# Patient Record
Sex: MALE | Race: WHITE | NOT HISPANIC OR LATINO | Employment: STUDENT | URBAN - METROPOLITAN AREA
[De-identification: names, ages, dates, MRNs, and addresses within clinical notes are randomized per-mention and may not be internally consistent; named-entity substitution may affect disease eponyms.]

---
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
## 2020-04-20 ENCOUNTER — COMMUNICATION - HEALTHEAST (OUTPATIENT)
Dept: FAMILY MEDICINE | Facility: CLINIC | Age: 20
End: 2020-04-20

## 2021-05-13 ENCOUNTER — OFFICE VISIT - HEALTHEAST (OUTPATIENT)
Dept: FAMILY MEDICINE | Facility: CLINIC | Age: 21
End: 2021-05-13

## 2021-05-13 DIAGNOSIS — Z00.129 ENCOUNTER FOR ROUTINE CHILD HEALTH EXAMINATION WITHOUT ABNORMAL FINDINGS: ICD-10-CM

## 2021-05-13 ASSESSMENT — MIFFLIN-ST. JEOR: SCORE: 1526.1

## 2021-05-27 VITALS
RESPIRATION RATE: 16 BRPM | SYSTOLIC BLOOD PRESSURE: 103 MMHG | WEIGHT: 133.4 LBS | BODY MASS INDEX: 22.77 KG/M2 | HEART RATE: 47 BPM | HEIGHT: 64 IN | DIASTOLIC BLOOD PRESSURE: 53 MMHG

## 2021-05-27 NOTE — TELEPHONE ENCOUNTER
Name of form/paperwork: Other:  [place] Form  Have you been seen for this request: Last Physical was 11/30/18 with Dr Dial.  Do we have the form: Yes- Mom dropped off form at clinic, I put in out guide and placed in PURPLE team basket.  When is form needed by: N/A  How would you like the form returned: mail in postage paid envelope marianela mom provided for her.  Fax Number: n/a  Patient Notified form requests are processed in 3-5 business days: No  (If patient needs form sooner, please note that in this message.)  Okay to leave a detailed message? Yes

## 2021-05-27 NOTE — TELEPHONE ENCOUNTER
Name of form/paperwork: Other:  [place] form   Have you been seen for this request: Yes:  Last seen on 11/30/18  Do we have the form: Yes- Received 4/12/19  When is form needed by: asap  How would you like the form returned: Pt's mom would like form returned via mail. Envelope with address is attached to form.   Fax Number: n/a  Patient Notified form requests are processed in 3-5 business days: Yes. Placed form in purple team's work basket.  Okay to leave a detailed message? Yes.

## 2021-05-28 ASSESSMENT — ASTHMA QUESTIONNAIRES: ACT_TOTALSCORE: 23

## 2021-05-29 NOTE — TELEPHONE ENCOUNTER
Name of form/paperwork: Other:  student health evaluation form  Have you been seen for this request: Yes:  in chart  Do we have the form: Yes- 5-  When is form needed by: 5 business days  How would you like the form returned: mail with provided envelope  Fax Number: n/a  Patient Notified form requests are processed in 3-5 business days: Yes  (If patient needs form sooner, please note that in this message.)  Okay to leave a detailed message? Yes    Form placed in purple teams bin    Please mail

## 2021-05-30 VITALS — WEIGHT: 153 LBS | HEIGHT: 64 IN | BODY MASS INDEX: 26.12 KG/M2

## 2021-06-02 VITALS — BODY MASS INDEX: 24.59 KG/M2 | HEIGHT: 64 IN | WEIGHT: 144 LBS

## 2021-06-07 NOTE — TELEPHONE ENCOUNTER
Form in your basket. Has not been seen since 11/2018. Please review and advise if you want WCC done before doing form

## 2021-06-07 NOTE — TELEPHONE ENCOUNTER
Forms Request  Name of form/paperwork: Other:  [place] physical form  Have you been seen for this request: Last seen on 11.30.18  Do we have the form: Yes- Form was mailed into the clinic, I put in out guide and placed in PURPLE team basket.  When is form needed by: N/A  How would you like the form returned: Mail Envelope included with the form to mail back to patient.  Patient Notified form requests are processed in 3-5 business days: No    Okay to leave a detailed message? Yes

## 2021-06-08 NOTE — PROGRESS NOTES
[facility] Well Child Check    ASSESSMENT & PLAN  Parviz Meneses is a 16  y.o. 1  m.o. who has normal growth and normal development.    Diagnoses and all orders for this visit:    Encounter for routine child health examination without abnormal findings  -     Hearing Screening  -     Vision Screening    Other orders  -     clindamycin-benzoyl peroxide (BENZACLIN) gel; Apply to affected area 2 times daily - face, neck, back  Dispense: 50 g; Refill: 3  -     HPV vaccine 9 valent 3 dose IM  -     Meningococcal MCV4P    Pt is working on weight loss  Feels acne medication is working well    Return to clinic in 1 year for a Well Child Check or sooner as needed    IMMUNIZATIONS/LABS  Immunizations were reviewed and orders were placed as appropriate.    REFERRALS  Dental:  Recommend routine dental care as appropriate.  Other:  No additional referrals were made at this time.    ANTICIPATORY GUIDANCE  I have reviewed age appropriate anticipatory guidance.  Social:  Friends  Parenting:  Homework  Nutrition:  Body Image  Health:  Acne, Drugs, Alcohol and Self Testicular Exam    HEALTH HISTORY  Do you have any concerns that you'd like to discuss today?: No concerns       Refills needed? Yes    Do you have any forms that need to be filled out? No        Do you have any significant health concerns in your family history?: No  No family history on file.  Since your last visit, have there been any major changes in your family, such as a move, job change, separation, divorce, or death in the family?: No    Home  Who lives in your home?:   Mom, step-dad, 6 sisters   Social History     Social History Narrative     Do you have any trouble with sleep?:  No    Education  What school does your child attend?:   [employer]   What grade is your child in?:  10th  How does the patient perform in school (grades, behavior, attention, homework?:   Not doing as well as he thinks he could do    He could be doing better. He occasionally gets  behind on homework, but he does well on finals. He is trying to stay on top of things this semester. He has been averaging B's or slightly below.    Eating  Does patient eat regular meals including fruits and vegatables?:  yes  What is the patient drinking (cow's milk, water, soda, juice, sports drinks, energy drinks, etc)?: cow's milk- 1%, some pop, water   Does patient have concerns about body or appearance?:  No    Activities  Does the patient have friends?:  yes  Does the patient get at least one hour of physical activity per day?:  yes, trying   Does the patient have less than 2 hours of screen time per day (aside from homework)?:  no, 3-4 hours daily  What does your child do for exercise?:   Sit-ups and push-ups  Does the patient have interest/participate in community activities/volunteers/school sports?:  Yes, music class, theater, Rastafarian     MENTAL HEALTH SCREENING  PHQ-2 Total Score: 0 (2/3/2017  3:00 PM)  PHQ-2 Total Score: 0 (2/3/2017  3:00 PM)    VISION/HEARING  Vision: Completed. See Results  Hearing:  Completed. See Results     Hearing Screening    125Hz 250Hz 500Hz 1000Hz 2000Hz 3000Hz 4000Hz 6000Hz 8000Hz   Right ear:   25 25 25  25     Left ear:   25 25 25  25        Visual Acuity Screening    Right eye Left eye Both eyes   Without correction: 10/10 10/10 10/8   With correction:          TB Risk Assessment:  The patient and/or parent/guardian answer positive to:  patient and/or parent/guardian answer 'no' to all screening TB questions    Flouride Varnish Application Screening  Is child seen by dentist?     Yes    Patient Active Problem List   Diagnosis     Allergy To Dogs     Attention-deficit Hyperactivity Disorder     Contusion Of The Right Elbow With Intact Skin Surface     A Fall Due To Slipping, Tripping, Or Stumbling     Closed Colles' Fracture Of The Left Wrist       Drugs  Does the patient use tobacco/alcohol/drugs?:  no    Safety  Does the patient have any safety concerns (peer or home)?:   "no  Does the patient use safety belts, helmets and other safety equipment?:  yes    Sex  Is the patient sexually active?:  no    MEASUREMENTS  Height:  5' 3.5\" (1.613 m)  Weight: 153 lb (69.4 kg)  BMI: Body mass index is 26.68 kg/(m^2).  Blood Pressure: 102/54  Blood pressure percentiles are 17 % systolic and 20 % diastolic based on NHBPEP's 4th Report. Blood pressure percentile targets: 90: 126/79, 95: 130/83, 99 + 5 mmH/96.    REVIEW OF SYSTEMS  His mother is in the lobby. He hit his elbow on the ice the other day. It is slightly sore on occasion, but he is not overly concerned about it. He has good range of motion. The acne medication is working well for him, and it does not dry his skin out. He has been using it once per night, mostly just on his face. He denies pain with bowel movements or blood in the stool. He has been doing monthly self testicular exams. He denies joint pain or growing pains. He is slightly heavier than he would like to be, so he has been working out. He is down a few pounds from the last time he was seen. He does 30 pushups and sit ups before bed. He also does some planks. He has been keeping track of his diet on his phone. He rarely uses his inhaler. He only uses it when he is in contact with large dogs. He is not in a relationship. His friends have not been using alcohol or drugs. His mother wears corrective lenses.     PHYSICAL EXAM  General: Appears well developed and well-nourised  Head: Normocephalic   Eyes: Conjunctivae and lids are normal. Pupils are equal, round, and reactive to light.   Ears: External ears normal bilaterally  Nose: Normal  Mouth: 1+ tonsils, no swelling or exudate.   Neck: No swelling  Lungs: Clear to auscultation bilaterally  Cardiovascular: Regular rate and rhythm, no murmur present  Abdominal: Soft, normal bowel sounds, no masses or hepatosplenomegaly  Musculoskeletal: Normal range of motion. Normal spinal curvature. No joint swelling or deformity.  Skin: " No rashes or lesions  Neurological: Symmetric reflexes, no cranial nerve deficit, speech is normal.  Psychiatric: Normal mood and affect. Behavior normal.       ADDITIONAL HISTORY SUMMARIZED (2): None.  DECISION TO OBTAIN EXTRA INFORMATION (1): None.   RADIOLOGY TESTS (1): None.  LABS (1): None.  MEDICINE TESTS (1): None.  INDEPENDENT REVIEW (2 each): None.     The visit lasted a total of 11 minutes face to face with the patient. Over 50% of the time was spent counseling and educating the patient about general health maintenance.    IShabbir, am scribing for and in the presence of, Dr. Dial.    I, Dr. Dial, personally performed the services described in this documentation, as scribed by Shabbir Salgado in my presence, and it is both accurate and complete.    Total data points: 0

## 2021-06-17 NOTE — PROGRESS NOTES
[facility] Well Child Check    ASSESSMENT & PLAN  Parviz Meneses is a 20 y.o. who has normal growth and normal development.    Diagnoses and all orders for this visit:    Encounter for routine child health examination without abnormal findings  -     Cancel: Tdap vaccine greater than or equal to 6yo IM  -     Hearing Screening  -     Vision Screening      Parviz presents for an annual exam.  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  Forms are filled out.  He has allergies to dog dander.  Is a history of ADHD but does not take medication.  We were can update his tetanus, but he has his Covid vaccine this week.  He will follow-up in several weeks to update his tetanus.  Return to clinic in 1 year for a Well Child Check or sooner as needed    IMMUNIZATIONS/LABS  Immunizations were reviewed and orders were placed as appropriate.    REFERRALS  Dental:  The patient has already established care with a dentist.  Other:  No additional referrals were made at this time.    ANTICIPATORY GUIDANCE  I have reviewed age appropriate anticipatory guidance.    HEALTH HISTORY  Do you have any concerns that you'd like to discuss today?: Form for [place]      No question data found.    Do you have any significant health concerns in your family history?: ETOH/Depression - Father; Asthma mother  Family History   Problem Relation Age of Onset     Asthma Mother      Alcohol abuse Father      Depression Father      Since your last visit, have there been any major changes in your family, such as a move, job change, separation, divorce, or death in the family?: No  Has a lack of transportation kept you from medical appointments?: No    Home  Who lives in your home?:  College - parents and siblings  Social History     Social History Narrative     Not on file     Do you have any concerns about losing your housing?: No  Is your housing safe and comfortable?: Yes  Do you have any trouble with sleep?:   Yes    Education  What school do you child attend?:  [employer]  What grade are you in?:    How do you perform in school (grades, behavior, attention, homework?: Good     Eating  Do you eat regular meals including fruits and vegetables?:  yes  What are you drinking (cow's milk, water, soda, juice, sports drinks, energy drinks, etc)?: water  Have you been worried that you don't have enough food?: No  Do you have concerns about your body or appearance?:  No    Activities  Do you have friends?:  yes  Do you get at least one hour of physical activity per day?:  yes  How many hours a day are you in front of a screen other than for schoolwork (computer, TV, phone)?:  2  What do you do for exercise?:  yes   Do you have interest/participate in community activities/volunteers/school sports?:  yes    VISION/HEARING  Vision: Completed. See Results  Hearing:  Completed. See Results     Hearing Screening    125Hz 250Hz 500Hz 1000Hz 2000Hz 3000Hz 4000Hz 6000Hz 8000Hz   Right ear:   Pass Pass Pass  Pass Pass    Left ear:   Pass Pass Pass  Pass Pass       Visual Acuity Screening    Right eye Left eye Both eyes   Without correction: 20/20 20/16 20/16   With correction:          MENTAL HEALTH SCREENING  No flowsheet data found.  Social-emotional & mental health screening: No screening tool used  No concerns    TB Risk Assessment:  The patient and/or parent/guardian answer positive to:  no known risk of TB    Dyslipidemia Risk Screening  Have either of your parents or any of your grandparents had a stroke or heart attack before age 55?: No  Any parents with high cholesterol or currently taking medications to treat?: No     Dental  When was the last time you saw the dentist?: 1-3 months ago   Parent/Guardian declines the fluoride varnish application today. Fluoride not applied today.    Patient Active Problem List   Diagnosis     Allergy To Dogs     Attention deficit hyperactivity disorder (ADHD)     Contusion Of The Right Elbow With  "Intact Skin Surface     A Fall Due To Slipping, Tripping, Or Stumbling     Closed Colles' Fracture Of The Left Wrist       Drugs  Does the patient use tobacco/alcohol/drugs?:  no    Safety  Does the patient have any safety concerns (peer or home)?:  no  Does the patient use safety belts, helmets and other safety equipment?:  yes    Sex  Have you ever had sex?:  No    MEASUREMENTS  Height:  5' 4\" (1.626 m)  Weight: 133 lb 6.4 oz (60.5 kg)  BMI: Body mass index is 22.9 kg/m .  Blood Pressure: 103/53  Growth percentile SmartLinks can only be used for patients less than 20 years old.    PHYSICAL EXAM  Physical Exam     General: Awake, Alert and Cooperative   Head: Normocephalic and Atraumatic   Eyes: PERRL and EOMI   ENT: Normal pearly TMs bilaterally and Oropharynx clear   Neck: Supple and Thyroid without enlargement or nodules   Chest: Chest wall normal   Lungs: Clear to auscultation bilaterally   Heart:: Regular rate and rhythm and no murmurs   Abdomen: Soft, nontender, nondistended and no hepatosplenomegaly   : declined   Spine: Spine straight without curvature noted and Curvature of the spine noted on forward bending   Musculoskeletal: Moving all extremities and No pain in the extremities   Neuro: Alert and oriented times 3, Normal tone in upper and lower extremities, Cranial nerves 2-12 intact and Grossly normal   Skin: No lesions or rashes noted       "

## 2021-06-18 NOTE — PATIENT INSTRUCTIONS - HE
Patient Instructions by Joceline Butler MD at 5/13/2021  9:40 AM     Author: Joceline Butler MD Service: -- Author Type: Physician    Filed: 5/13/2021  9:58 AM Encounter Date: 5/13/2021 Status: Signed    : Joceline Butler MD (Physician)          Patient Education      [employer] HANDOUT- PATIENT  18 THROUGH 21 YEAR VISITS  Here are some suggestions from [employer] experts that may be of value to your family.     HOW YOU ARE DOING  Enjoy spending time with your family.  Find activities you are really interested in, such as sports, theater, or volunteering.  Try to be responsible for your schoolwork or work obligations.  Always talk through problems and never use violence.  If you get angry with someone, try to walk away.  If you feel unsafe in your home or have been hurt by someone, let us know. Hotlines and community agencies can also provide confidential help.  Talk with us if you are worried about your living or food situation. Community agencies and programs such as SNAP can help.  Dont smoke, vape, or use drugs. Avoid people who do when you can. Talk with us if you are worried about alcohol or drug use in your family.    YOUR DAILY LIFE  Visit the dentist at least twice a year.  Brush your teeth at least twice a day and floss once a day.  Be a healthy eater.  Have vegetables, fruits, lean protein, and whole grains at meals and snacks.  Limit fatty, sugary, salty foods that are low in nutrients, such as candy, chips, and ice cream.  Eat when youre hungry. Stop when you feel satisfied.  Eat breakfast.  Drink plenty of water.  Make sure to get enough calcium every day.  Have 3 or more servings of low-fat (1%) or fat-free milk and other low-fat dairy products, such as yogurt and cheese.  Women: Make sure to eat foods rich in folate, such as fortified grains and dark- green leafy vegetables.  Aim for at least 1 hour of physical activity every day.  Wear safety equipment when you play sports.  Get enough  sleep.  Talk with us about managing your health care and insurance as an adult.    YOUR FEELINGS  Most people have ups and downs. If you are feeling sad, depressed, nervous, irritable, hopeless, or angry, let us know or reach out to another health care professional.  Figure out healthy ways to deal with stress.  Try your best to solve problems and make decisions on your own.  Sexuality is an important part of your life. If you have any questions or concerns, we are here for you.    HEALTHY BEHAVIOR CHOICES  Avoid using drugs, alcohol, tobacco, steroids, and diet pills. Support friends who choose not to use.  If you use drugs or alcohol, let us know or talk with another trusted adult about it. We can help you with quitting or cutting down on your use.  Make healthy decisions about your sexual behavior.  If you are sexually active, always practice safe sex. Always use birth control along with a condom to prevent pregnancy and sexually transmitted infections.  All sexual activity should be something you want. No one should ever force or try to convince you.  Protect your hearing at work, home, and concerts. Keep your earbud volume down.    STAYING SAFE  Always be a safe and cautious .  Insist that everyone use a lap and shoulder seat belt.  Limit the number of friends in the car and avoid driving at night.  Avoid distractions. Never text or talk on the phone while you drive.  Do not ride in a vehicle with someone who has been using drugs or alcohol.  If you feel unsafe driving or riding with someone, call someone you trust to drive you.  Wear helmets and protective gear while playing sports. Wear a helmet when riding a bike, a motorcycle, or an ATV or when skiing or skateboarding.  Always use sunscreen and a hat when youre outside.  Fighting and carrying weapons can be dangerous. Talk with your parents, teachers, or doctor about how to avoid these situations.      Helpful Resources:  National Domestic Violence  Hotline: 842.408.4923   Consistent with Bright Futures: Guidelines for Health Supervision of Infants, Children, and Adolescents, 4th Edition  For more information, go to https://brightfutures.aap.org.

## 2021-06-22 NOTE — PROGRESS NOTES
[facility] Well Child Check    ASSESSMENT & PLAN  Parviz Meneses is a 17  y.o. 11  m.o. who has normal growth and normal development.    Diagnoses and all orders for this visit:    Encounter for routine child health examination without abnormal findings  -     Hearing Screening  -     Vision Screening  -     PHQ9 Depression Screen    Other orders  -     Influenza, Seasonal,Quad Inj, 36+ MOS        Return to clinic in 1 year for a Well Child Check or sooner as needed    IMMUNIZATIONS/LABS  Immunizations were reviewed and orders were placed as appropriate.    REFERRALS  Dental:  Recommend routine dental care as appropriate.  Other:  No additional referrals were made at this time.    ANTICIPATORY GUIDANCE  I have reviewed age appropriate anticipatory guidance.  Social:  Friends and Extracurricular Activities  Parenting:  Homework  Nutrition:  Junk Food  Play and Communication:  Hobbies  Health:  Acne and Self-image building  Safety:  Bike/Motorcycle Helmets  Sexuality:  Safe Sex    HEALTH HISTORY  Do you have any concerns that you'd like to discuss today?: No concerns       Roomed by: Ainsley HEMPHILL     Refills needed? No    Do you have any forms that need to be filled out? No        Do you have any significant health concerns in your family history?: No  No family history on file.  Since your last visit, have there been any major changes in your family, such as a move, job change, separation, divorce, or death in the family?: No  Has a lack of transportation kept you from medical appointments?: No    Home  Who lives in your home?:  Mom, step-dad and 2 sisters  Social History     Social History Narrative     Not on file     Do you have any concerns about losing your housing?: No  Is your housing safe and comfortable?: Yes  Do you have any trouble with sleep?:  Yes: some difficulty falling asleep    Education  What school do you child attend?:  [employer]   What grade are you in?:  12th  How do you perform in  school (grades, behavior, attention, homework?: Doing well, no concerns      Eating  Do you eat regular meals including fruits and vegetables?:  yes  What are you drinking (cow's milk, water, soda, juice, sports drinks, energy drinks, etc)?: cow's milk- 2%, water and soda & almond milk, soy milk  Have you been worried that you don't have enough food?: No  Do you have concerns about your body or appearance?:  No    Activities  Do you have friends?:  yes  Do you get at least one hour of physical activity per day?:  no  How many hours a day are you in front of a screen other than for schoolwork (computer, TV, phone)?:  4  What do you do for exercise?:  Sit-ups, push-ups, walks, planks   Do you have interest/participate in community activities/volunteers/school sports?:  yes    MENTAL HEALTH SCREENING  No Data Recorded  No Data Recorded    VISION/HEARING  Vision: Completed. See Results  Hearing:  Completed. See Results     Hearing Screening    125Hz 250Hz 500Hz 1000Hz 2000Hz 3000Hz 4000Hz 6000Hz 8000Hz   Right ear:   30 20 20  20 20    Left ear:   25 20 20  20 20       Visual Acuity Screening    Right eye Left eye Both eyes   Without correction: 10/12.5 10/10 10/8   With correction:      Comments: Plus Lens: Pass: blurring of vision with +2.50 lens glasses      TB Risk Assessment:  The patient and/or parent/guardian answer positive to:  patient and/or parent/guardian answer 'no' to all screening TB questions    Dyslipidemia Risk Screening  Have either of your parents or any of your grandparents had a stroke or heart attack before age 55?: No  Any parents with high cholesterol or currently taking medications to treat?: No     Dental  When was the last time you saw the dentist?: 3-6 months ago   Parent/Guardian declines the fluoride varnish application today. Fluoride not applied today.    Patient Active Problem List   Diagnosis     Allergy To Dogs     Attention-deficit Hyperactivity Disorder     Contusion Of The Right  "Elbow With Intact Skin Surface     A Fall Due To Slipping, Tripping, Or Stumbling     Closed Colles' Fracture Of The Left Wrist       Drugs  Does the patient use tobacco/alcohol/drugs?:  no    Safety  Does the patient have any safety concerns (peer or home)?:  no  Does the patient use safety belts, helmets and other safety equipment?:  yes    Sex  Have you ever had sex?:  Yes    MEASUREMENTS  Height:  5' 3.58\" (1.615 m)  Weight: 144 lb (65.3 kg)  BMI: Body mass index is 25.04 kg/m .  Blood Pressure: 114/60  Blood pressure percentiles are 48 % systolic and 29 % diastolic based on the 2017 AAP Clinical Practice Guideline. Blood pressure percentile targets: 90: 128/78, 95: 133/81, 95 + 12 mmH/93.    PHYSICAL EXAM  Physical Examination: General appearance - alert, well appearing, and in no distress  Mental status - alert, oriented to person, place, and time  Eyes - pupils equal and reactive, extraocular eye movements intact  Ears - bilateral TM's and external ear canals normal  Mouth - mucous membranes moist, pharynx normal without lesions  Neck - supple, no significant adenopathy  Lymphatics - no palpable lymphadenopathy, no hepatosplenomegaly  Chest - clear to auscultation, no wheezes, rales or rhonchi, symmetric air entry  Heart - normal rate, regular rhythm, normal S1, S2, no murmurs, rubs, clicks or gallops  Abdomen - soft, nontender, nondistended, no masses or organomegaly  Back exam - full range of motion, no tenderness, palpable spasm or pain on motion  Neurological - alert, oriented, normal speech, no focal findings or movement disorder noted  Musculoskeletal - no joint tenderness, deformity or swelling  Extremities - peripheral pulses normal, no pedal edema, no clubbing or cyanosis  Skin - acne affecting his face and back and anterior chest    "

## 2022-07-23 ENCOUNTER — HEALTH MAINTENANCE LETTER (OUTPATIENT)
Age: 22
End: 2022-07-23

## 2022-10-01 ENCOUNTER — HEALTH MAINTENANCE LETTER (OUTPATIENT)
Age: 22
End: 2022-10-01

## 2022-12-19 ENCOUNTER — OFFICE VISIT (OUTPATIENT)
Dept: FAMILY MEDICINE | Facility: CLINIC | Age: 22
End: 2022-12-19
Payer: COMMERCIAL

## 2022-12-19 VITALS
TEMPERATURE: 97.9 F | SYSTOLIC BLOOD PRESSURE: 108 MMHG | BODY MASS INDEX: 23.05 KG/M2 | HEIGHT: 64 IN | DIASTOLIC BLOOD PRESSURE: 62 MMHG | RESPIRATION RATE: 12 BRPM | OXYGEN SATURATION: 98 % | HEART RATE: 48 BPM | WEIGHT: 135 LBS

## 2022-12-19 DIAGNOSIS — Z00.00 ROUTINE GENERAL MEDICAL EXAMINATION AT A HEALTH CARE FACILITY: Primary | ICD-10-CM

## 2022-12-19 PROCEDURE — 99395 PREV VISIT EST AGE 18-39: CPT | Performed by: NURSE PRACTITIONER

## 2022-12-19 RX ORDER — ALBUTEROL SULFATE 90 UG/1
2 AEROSOL, METERED RESPIRATORY (INHALATION)
COMMUNITY

## 2022-12-19 ASSESSMENT — ENCOUNTER SYMPTOMS
DIZZINESS: 0
SORE THROAT: 0
HEADACHES: 0
CONSTIPATION: 0
COUGH: 0
PARESTHESIAS: 0
NAUSEA: 0
SHORTNESS OF BREATH: 0
DYSURIA: 0
ABDOMINAL PAIN: 0
HEARTBURN: 0
JOINT SWELLING: 0
HEMATOCHEZIA: 0
EYE PAIN: 0
NERVOUS/ANXIOUS: 0
ARTHRALGIAS: 0
PALPITATIONS: 0
DIARRHEA: 0
CHILLS: 0
MYALGIAS: 0
FEVER: 0
FREQUENCY: 0
WEAKNESS: 0
HEMATURIA: 0

## 2022-12-19 ASSESSMENT — PAIN SCALES - GENERAL: PAINLEVEL: NO PAIN (0)

## 2022-12-19 NOTE — PROGRESS NOTES
SUBJECTIVE:   CC: Parviz is an 21 year old who presents for preventative health visit.   Patient has been advised of split billing requirements and indicates understanding: Yes     Healthy Habits:     Getting at least 3 servings of Calcium per day:  Yes    Bi-annual eye exam:  Yes    Dental care twice a year:  Yes    Sleep apnea or symptoms of sleep apnea:  None    Diet:  Regular (no restrictions)    Frequency of exercise:  6-7 days/week    Duration of exercise:  45-60 minutes    Taking medications regularly:  Not Applicable    Medication side effects:  Not applicable    PHQ-2 Total Score: 2    Additional concerns today:  No     Overall doing well. No concerns at this time. He is home for the winter break. Goes to [employer] in [place], [place].       Today's PHQ-2 Score:   PHQ-2 ( 1999 [employer]) 12/19/2022   Q1: Little interest or pleasure in doing things 1   Q2: Feeling down, depressed or hopeless 1   PHQ-2 Score 2   Q1: Little interest or pleasure in doing things Several days   Q2: Feeling down, depressed or hopeless Several days   PHQ-2 Score 2       Have you ever done Advance Care Planning? (For example, a Health Directive, POLST, or a discussion with a medical provider or your loved ones about your wishes): No, advance care planning information given to patient to review.  Patient declined advance care planning discussion at this time.    Social History     Tobacco Use     Smoking status: Never     Passive exposure: Never     Smokeless tobacco: Never   Substance Use Topics     Alcohol use: No     If you drink alcohol do you typically have >3 drinks per day or >7 drinks per week? No    Alcohol Use 12/19/2022   Prescreen: >3 drinks/day or >7 drinks/week? No   Prescreen: >3 drinks/day or >7 drinks/week? -       Last PSA: No results found for: PSA    Reviewed orders with patient. Reviewed health maintenance and updated orders accordingly - Yes  Lab work is in process  Labs reviewed in EPIC  BP Readings from Last 3  Encounters:   12/19/22 108/62   05/13/21 103/53    Wt Readings from Last 3 Encounters:   12/19/22 61.2 kg (135 lb)   05/13/21 60.5 kg (133 lb 6.4 oz)   11/30/18 65.3 kg (144 lb) (44 %, Z= -0.16)*     * Growth percentiles are based on [facility][note 3] (Boys, 2-20 Years) data.                  Patient Active Problem List   Diagnosis     Allergy To Dogs     Attention deficit hyperactivity disorder (ADHD)     Contusion Of The Right Elbow With Intact Skin Surface     A Fall Due To Slipping, Tripping, Or Stumbling     Closed Colles' Fracture Of The Left Wrist     History reviewed. No pertinent surgical history.    Social History     Tobacco Use     Smoking status: Never     Passive exposure: Never     Smokeless tobacco: Never   Substance Use Topics     Alcohol use: No     Family History   Problem Relation Age of Onset     Asthma Mother      Alcoholism Father      Depression Father          Current Outpatient Medications   Medication Sig Dispense Refill     albuterol (PROAIR HFA/PROVENTIL HFA/VENTOLIN HFA) 108 (90 Base) MCG/ACT inhaler Inhale 2 puffs into the lungs       No Known Allergies  No lab results found.     Reviewed and updated as needed this visit by clinical staff   Tobacco  Allergies  Meds              Reviewed and updated as needed this visit by Provider                 History reviewed. No pertinent past medical history.   History reviewed. No pertinent surgical history.    Review of Systems   Constitutional: Negative for chills and fever.   HENT: Negative for congestion, ear pain, hearing loss and sore throat.    Eyes: Negative for pain and visual disturbance.   Respiratory: Negative for cough and shortness of breath.    Cardiovascular: Negative for chest pain, palpitations and peripheral edema.   Gastrointestinal: Negative for abdominal pain, constipation, diarrhea, heartburn, hematochezia and nausea.   Genitourinary: Negative for dysuria, frequency, genital sores, hematuria and urgency.   Musculoskeletal: Negative  "for arthralgias, joint swelling and myalgias.   Skin: Negative for rash.   Neurological: Negative for dizziness, weakness, headaches and paresthesias.   Psychiatric/Behavioral: Negative for mood changes. The patient is not nervous/anxious.        OBJECTIVE:   /62   Pulse (!) 48   Temp 97.9  F (36.6  C) (Tympanic)   Resp 12   Ht 1.626 m (5' 4\")   Wt 61.2 kg (135 lb)   SpO2 98%   BMI 23.17 kg/m      Physical Exam  GENERAL: healthy, alert and no distress  EYES: Eyes grossly normal to inspection, PERRL and conjunctivae and sclerae normal  HENT: ear canals and TM's normal, nose and mouth without ulcers or lesions  NECK: no adenopathy, no asymmetry, masses, or scars and thyroid normal to palpation  RESP: lungs clear to auscultation - no rales, rhonchi or wheezes  CV: regular rate and rhythm, normal S1 S2, no S3 or S4, no murmur, click or rub, no peripheral edema and peripheral pulses strong  ABDOMEN: soft, nontender, no hepatosplenomegaly, no masses and bowel sounds normal  MS: no gross musculoskeletal defects noted, no edema  SKIN: no suspicious lesions or rashes  NEURO: Normal strength and tone, mentation intact and speech normal  PSYCH: mentation appears normal, affect normal/bright    ASSESSMENT/PLAN:       ICD-10-CM    1. Routine general medical examination at a health care facility  Z00.00         Healthy Male exam completed today.  I discussed preventative measures with the patient including continuing with lifestyle modifications of a balanced diet and regular cardiovascular exercise.  I discussed self testicular exams and how to complete these.  I encouraged patient to follow-up yearly for annual physicals and sooner as needed.     Patient has been advised of split billing requirements and indicates understanding: Yes      COUNSELING:   Reviewed preventive health counseling, as reflected in patient instructions       Regular exercise       Healthy diet/nutrition       Family planning       Safe sex " practices/STD prevention        He reports that he has never smoked. He has never been exposed to tobacco smoke. He has never used smokeless tobacco.        EVY Mcclendon CNP  [facility]

## 2024-03-03 ENCOUNTER — HEALTH MAINTENANCE LETTER (OUTPATIENT)
Age: 24
End: 2024-03-03

## 2025-03-15 ENCOUNTER — HEALTH MAINTENANCE LETTER (OUTPATIENT)
Age: 25
End: 2025-03-15